# Patient Record
(demographics unavailable — no encounter records)

---

## 2024-12-12 NOTE — HISTORY OF PRESENT ILLNESS
[FreeTextEntry1] :  f/up of hyperthyroidism   Prior or current medication thyroid use: on MMI 5mg po daily Monday to Friday and 2 tabs Saturday and Sunday  no nausea, no vomiting, no abdominal pain, no dark urine  Palpitations: No Anterior neck pain: No   Dyspnea: No Dysphagia: No

## 2025-03-12 NOTE — HISTORY OF PRESENT ILLNESS
[FreeTextEntry1] :  f/up of hyperthyroidism   Prior or current medication thyroid use: on MMI 5mg po daily   no nausea, no vomiting, no abdominal pain, no dark urine  Palpitations: No Anterior neck pain: No   Dyspnea: No Dysphagia: No